# Patient Record
(demographics unavailable — no encounter records)

---

## 2025-02-12 NOTE — HISTORY OF PRESENT ILLNESS
[de-identified] : 49 year F presents for intermittent episodes of dizziness for 10 days Denies fluctuating hearing loss or ear fullness States had an extremely tough week at work prior to the symptoms  Patient describes sensation as rocking, spinning, swaying, States changes in motion, rolling in bed, getting up quickly bring it on Last for several seconds to minutes States might have decrease hearing because  says she can't hear him Denies history of headaches/migraines and motion sickness Denies history of ear surgeries

## 2025-02-12 NOTE — ASSESSMENT
[FreeTextEntry1] : 49 year F presents with Dizziness   No patterns of low frequency or asymmetrical SNHL on audiogram. [place] Diaz-pike negative. Fukuda Step Test Negative. Negative Romberg. Patient denies acute history of recent viral infection or history of headaches or migraines. Denies fullness in the ear or fluctuations in hearing during episodes   Personally reviewed audiogram shows AU Hearing -8k hz. AU Tymp A   Based on clinical history and physical exam suspicious for BPPV which has now resolved with some underlying residual vestibular weakness   Recommend Benign Paroxysmal Positional Vertigo -Discussed with patient that BPPV causes brief episodes of mild to intense dizziness. It is usually triggered by specific changes head position. This might occur when you tip your head up or down, when you lie down, or when you turn over or sit up in bed. -BPPV occurs when crystals within the otolith organs in your inner ear which monitor head's movements become dislodged. When they become dislodged, they can move into one of the semicircular canals. This causes the semicircular canal to become sensitive to head position changes it would normally not respond to, which is what triggers the sense of disequilibrium. -The Epley Maneuver is a crystal repositioning procedure that moves the crystals from the fluid-filled semicircular canals of your inner ear into a tiny baglike open area (vestibule) that houses one of the otolith organs in your ear, where these particles don't cause trouble and are more easily resorbed. 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

## 2025-02-12 NOTE — HISTORY OF PRESENT ILLNESS
[de-identified] : 49 year F presents for intermittent episodes of dizziness for 10 days Denies fluctuating hearing loss or ear fullness States had an extremely tough week at work prior to the symptoms  Patient describes sensation as rocking, spinning, swaying, States changes in motion, rolling in bed, getting up quickly bring it on Last for several seconds to minutes States might have decrease hearing because  says she can't hear him Denies history of headaches/migraines and motion sickness Denies history of ear surgeries

## 2025-02-12 NOTE — PHYSICAL EXAM
[Fukuda Step Test] : Fukuda Step Test was Positive [Normal] : mucosa is normal [Midline] : trachea located in midline position [Nystagmus] : ~T no ~M nystagmus was seen [Romberg's Sign] : Romberg's sign was absent [Cornel-Hallsrike] : [place]-Hallpike: Negative

## 2025-02-12 NOTE — PHYSICAL EXAM
[Fukuda Step Test] : Fukuda Step Test was Positive [Normal] : mucosa is normal [Midline] : trachea located in midline position [Nystagmus] : ~T no ~M nystagmus was seen [Romberg's Sign] : Romberg's sign was absent [Cornel-Hallsrike] : [place]-Hallpike: Negative

## 2025-02-12 NOTE — DATA REVIEWED
Head,  normocephalic,  atraumatic,  Face,  Face within normal limits,  Ears,  External ears within normal limits,  Nose/Nasopharynx,  External nose  normal appearance,  Lips,  Appearance normal
[de-identified] : An audiogram was ordered and performed including pure tones, tympanometry and speech testing for the patients complaint of hearing loss I have independently reviewed the patient's audiogram from today and my findings include  AU Hearing -8k hz. AU Tymp A
[de-identified] : An audiogram was ordered and performed including pure tones, tympanometry and speech testing for the patients complaint of hearing loss I have independently reviewed the patient's audiogram from today and my findings include  AU Hearing -8k hz. AU Tymp A

## 2025-02-12 NOTE — ASSESSMENT
[FreeTextEntry1] : 49 year F presents with Dizziness   No patterns of low frequency or asymmetrical SNHL on audiogram. [place] Diaz-pike negative. Fukuda Step Test Negative. Negative Romberg. Patient denies acute history of recent viral infection or history of headaches or migraines. Denies fullness in the ear or fluctuations in hearing during episodes   Personally reviewed audiogram shows AU Hearing -8k hz. AU Tymp A   Based on clinical history and physical exam suspicious for BPPV which has now resolved with some underlying residual vestibular weakness   Recommend Benign Paroxysmal Positional Vertigo -Discussed with patient that BPPV causes brief episodes of mild to intense dizziness. It is usually triggered by specific changes head position. This might occur when you tip your head up or down, when you lie down, or when you turn over or sit up in bed. -BPPV occurs when crystals within the otolith organs in your inner ear which monitor head's movements become dislodged. When they become dislodged, they can move into one of the semicircular canals. This causes the semicircular canal to become sensitive to head position changes it would normally not respond to, which is what triggers the sense of disequilibrium. -The Epley Maneuver is a crystal repositioning procedure that moves the crystals from the fluid-filled semicircular canals of your inner ear into a tiny baglike open area (vestibule) that houses one of the otolith organs in your ear, where these particles don't cause trouble and are more easily resorbed. 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

## 2025-07-29 NOTE — HISTORY OF PRESENT ILLNESS
[FreeTextEntry1] : CPE [de-identified] : Last year felt like was having menopause brain fog. Just trying to be more aware of what she's eating. Sleep is better. Didn't take HRT.  Saw Derm 5/7  RUQ Hemangioma  Not having any GERD.   Ophtho UTD Dentist UTD (due Nov 2025)  Pituitary adenoma Hypothyroidism Saw [facility] at [facility] May 20- on cabergoline. Dr. Jeanne Keane, DO Did prolactin level.  Last MRI 2023  Feels really good overall. Has some stress with work-life balance but does not feel "trapped" Feels sad sometimes because of 's PLS diagnosis. Has had more issues with mobility

## 2025-07-29 NOTE — ASSESSMENT
[FreeTextEntry1] : 50 y/o F with liver hemangioma, prolactinoma on cabergolinehere for CPE  Liver hemangioma - CT done 2019; asymptomatic. 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 - Will obtain records from Endo Dr. Jeanne Keane (last seen <1 year ago). Prolactin level done at that time.  Hypothyroidism - off of meds - Check TFTs  Varicose veins s/p sclerotherapy Oct 2022 - Continue compression stockings - Vascular referral  HCM: - Tdap 2022 - Mammo Feb 2024; MRI breast 2024 --> due for repeat mammo now. - DEXA 7/2024 - normal (prior one in 2022 with osteopenia) - Colonoscopy Dec 2023 - 1 hyperplastic polyp; repeat in 7 years - Derm annually - Check labs as ordered  RTC in 1 year or sooner prn [Vaccines Reviewed] : Immunizations reviewed today. Please see immunization details in the vaccine log within the immunization flowsheet.

## 2025-07-29 NOTE — HEALTH RISK ASSESSMENT
[Patient reported bone density results were normal] : Patient reported bone density results were normal [Patient reported colonoscopy was normal] : Patient reported colonoscopy was normal [Patient reported PAP Smear was normal] : Patient reported PAP Smear was normal [MammogramDate] : 02/24 [MammogramComments] : MRI 7/2024 [PapSmearDate] : 07/24 [PapSmearComments] : NEGATIVE FOR INTRAEPITHELIAL LESION OR MALIGNANCY. [BoneDensityDate] : 07/24 [ColonoscopyDate] : 12/23 [ColonoscopyComments] : 1 hyperplastic polyp. EGD with mild chronic gastritis. Neg for h. pylori [HIVDate] : 07/24 [HepatitisCDate] : 07/24

## 2025-07-29 NOTE — PHYSICAL EXAM
[No Acute Distress] : no acute distress [Well Nourished] : well nourished [Well Developed] : well developed [Well-Appearing] : well-appearing [Normal Sclera/Conjunctiva] : normal sclera/conjunctiva [PERRL] : pupils equal round and reactive to light [EOMI] : extraocular movements intact [Normal Outer Ear/Nose] : the outer ears and nose were normal in appearance [Normal Oropharynx] : the oropharynx was normal [No JVD] : no jugular venous distention [No Lymphadenopathy] : no lymphadenopathy [Supple] : supple [Thyroid Normal, No Nodules] : the thyroid was normal and there were no nodules present [No Respiratory Distress] : no respiratory distress  [No Accessory Muscle Use] : no accessory muscle use [Clear to Auscultation] : lungs were clear to auscultation bilaterally [Normal Rate] : normal rate  [Regular Rhythm] : with a regular rhythm [Normal S1, S2] : normal S1 and S2 [No Murmur] : no murmur heard [No Carotid Bruits] : no carotid bruits [No Abdominal Bruit] : a ~M bruit was not heard ~T in the abdomen [Pedal Pulses Present] : the pedal pulses are present [No Edema] : there was no peripheral edema [No Palpable Aorta] : no palpable aorta [No Extremity Clubbing/Cyanosis] : no extremity clubbing/cyanosis [Soft] : abdomen soft [Non Tender] : non-tender [Non-distended] : non-distended [No Masses] : no abdominal mass palpated [No HSM] : no HSM [Normal Bowel Sounds] : normal bowel sounds [Normal Posterior Cervical Nodes] : no posterior cervical lymphadenopathy [Normal Anterior Cervical Nodes] : no anterior cervical lymphadenopathy [No CVA Tenderness] : no CVA  tenderness [No Spinal Tenderness] : no spinal tenderness [No Joint Swelling] : no joint swelling [Grossly Normal Strength/Tone] : grossly normal strength/tone [No Rash] : no rash [Coordination Grossly Intact] : coordination grossly intact [No Focal Deficits] : no focal deficits [Normal Gait] : normal gait [Deep Tendon Reflexes (DTR)] : deep tendon reflexes were 2+ and symmetric [Normal Affect] : the affect was normal [Normal Insight/Judgement] : insight and judgment were intact [de-identified] : bilateral LE varicosities